# Patient Record
Sex: FEMALE | URBAN - METROPOLITAN AREA
[De-identification: names, ages, dates, MRNs, and addresses within clinical notes are randomized per-mention and may not be internally consistent; named-entity substitution may affect disease eponyms.]

---

## 2018-12-14 ENCOUNTER — RECORDS - HEALTHEAST (OUTPATIENT)
Dept: LAB | Facility: CLINIC | Age: 53
End: 2018-12-14

## 2018-12-14 LAB
ALBUMIN SERPL-MCNC: 4 G/DL (ref 3.5–5)
ALP SERPL-CCNC: 68 U/L (ref 45–120)
ALT SERPL W P-5'-P-CCNC: 15 U/L (ref 0–45)
AST SERPL W P-5'-P-CCNC: 20 U/L (ref 0–40)
BASOPHILS # BLD AUTO: 0 THOU/UL (ref 0–0.2)
BASOPHILS NFR BLD AUTO: 0 % (ref 0–2)
BILIRUB SERPL-MCNC: 0.5 MG/DL (ref 0–1)
BUN SERPL-MCNC: 21 MG/DL (ref 8–22)
C REACTIVE PROTEIN LHE: <0.1 MG/DL (ref 0–0.8)
CALCIUM SERPL-MCNC: 9.7 MG/DL (ref 8.5–10.5)
CHLORIDE BLD-SCNC: 104 MMOL/L (ref 98–107)
CHOLEST SERPL-MCNC: 215 MG/DL
CHOLEST SERPL-MCNC: 215 MG/DL
CK SERPL-CCNC: 44 U/L (ref 30–190)
CO2 SERPL-SCNC: 26 MMOL/L (ref 22–31)
CREAT SERPL-MCNC: 0.73 MG/DL (ref 0.6–1.1)
EOSINOPHIL # BLD AUTO: 0.1 THOU/UL (ref 0–0.4)
EOSINOPHIL NFR BLD AUTO: 2 % (ref 0–6)
ERYTHROCYTE [DISTWIDTH] IN BLOOD BY AUTOMATED COUNT: 11.4 % (ref 11–14.5)
ERYTHROCYTE [SEDIMENTATION RATE] IN BLOOD BY WESTERGREN METHOD: 12 MM/HR (ref 0–20)
FASTING STATUS PATIENT QL REPORTED: ABNORMAL
GFR SERPL CREATININE-BSD FRML MDRD: >60 ML/MIN/1.73M2
GGT SERPL-CCNC: 13 U/L (ref 0–50)
GLUCOSE BLD-MCNC: 84 MG/DL (ref 70–125)
HCT VFR BLD AUTO: 43.3 % (ref 35–47)
HDLC SERPL-MCNC: 68 MG/DL
HGB BLD-MCNC: 14.9 G/DL (ref 12–16)
IRON SERPL-MCNC: 93 UG/DL (ref 42–175)
LDH SERPL L TO P-CCNC: 149 U/L (ref 125–220)
LDLC SERPL CALC-MCNC: 138 MG/DL
LYMPHOCYTES # BLD AUTO: 2.4 THOU/UL (ref 0.8–4.4)
LYMPHOCYTES NFR BLD AUTO: 43 % (ref 20–40)
MAGNESIUM SERPL-MCNC: 2.3 MG/DL (ref 1.8–2.6)
MCH RBC QN AUTO: 31.2 PG (ref 27–34)
MCHC RBC AUTO-ENTMCNC: 34.4 G/DL (ref 32–36)
MCV RBC AUTO: 91 FL (ref 80–100)
MONOCYTES # BLD AUTO: 0.5 THOU/UL (ref 0–0.9)
MONOCYTES NFR BLD AUTO: 9 % (ref 2–10)
NEUTROPHILS # BLD AUTO: 2.6 THOU/UL (ref 2–7.7)
NEUTROPHILS NFR BLD AUTO: 46 % (ref 50–70)
PHOSPHATE SERPL-MCNC: 4 MG/DL (ref 2.5–4.5)
PLATELET # BLD AUTO: 284 THOU/UL (ref 140–440)
PMV BLD AUTO: 9 FL (ref 8.5–12.5)
POTASSIUM BLD-SCNC: 4.1 MMOL/L (ref 3.5–5)
PROT SERPL-MCNC: 6.7 G/DL (ref 6–8)
RBC # BLD AUTO: 4.78 MILL/UL (ref 3.8–5.4)
SODIUM SERPL-SCNC: 139 MMOL/L (ref 136–145)
TRIGL SERPL-MCNC: 46 MG/DL
TRIGL SERPL-MCNC: 46 MG/DL
TSH SERPL DL<=0.005 MIU/L-ACNC: 1.48 UIU/ML (ref 0.3–5)
URATE SERPL-MCNC: 5.2 MG/DL (ref 2–7.5)
WBC: 5.6 THOU/UL (ref 4–11)

## 2019-01-23 ENCOUNTER — RECORDS - HEALTHEAST (OUTPATIENT)
Dept: ADMINISTRATIVE | Facility: OTHER | Age: 54
End: 2019-01-23

## 2019-02-19 ENCOUNTER — COMMUNICATION - HEALTHEAST (OUTPATIENT)
Dept: TELEHEALTH | Facility: CLINIC | Age: 54
End: 2019-02-19

## 2019-02-19 ENCOUNTER — HOSPITAL ENCOUNTER (OUTPATIENT)
Dept: MRI IMAGING | Facility: CLINIC | Age: 54
Discharge: HOME | End: 2019-02-19
Attending: PSYCHIATRY & NEUROLOGY

## 2019-02-19 DIAGNOSIS — R93.89 ABNORMAL MRI: ICD-10-CM

## 2019-02-19 DIAGNOSIS — R53.1 RIGHT SIDED WEAKNESS: ICD-10-CM

## 2019-03-08 ENCOUNTER — RECORDS - HEALTHEAST (OUTPATIENT)
Dept: RADIOLOGY | Facility: CLINIC | Age: 54
End: 2019-03-08

## 2019-05-06 ENCOUNTER — RECORDS - HEALTHEAST (OUTPATIENT)
Dept: LAB | Facility: CLINIC | Age: 54
End: 2019-05-06

## 2019-05-06 LAB — FERRITIN SERPL-MCNC: 210 NG/ML (ref 10–130)

## 2019-08-27 ENCOUNTER — RECORDS - HEALTHEAST (OUTPATIENT)
Dept: ADMINISTRATIVE | Facility: OTHER | Age: 54
End: 2019-08-27

## 2020-06-05 ENCOUNTER — RECORDS - HEALTHEAST (OUTPATIENT)
Dept: LAB | Facility: CLINIC | Age: 55
End: 2020-06-05

## 2020-06-05 LAB
ALBUMIN SERPL-MCNC: 3.7 G/DL (ref 3.5–5)
ALP SERPL-CCNC: 75 U/L (ref 45–120)
ALT SERPL W P-5'-P-CCNC: 18 U/L (ref 0–45)
AST SERPL W P-5'-P-CCNC: 25 U/L (ref 0–40)
BILIRUB SERPL-MCNC: 0.3 MG/DL (ref 0–1)
BUN SERPL-MCNC: 22 MG/DL (ref 8–22)
C REACTIVE PROTEIN LHE: 0.1 MG/DL (ref 0–0.8)
CALCIUM SERPL-MCNC: 9.3 MG/DL (ref 8.5–10.5)
CHLORIDE BLD-SCNC: 106 MMOL/L (ref 98–107)
CHOLEST SERPL-MCNC: 203 MG/DL
CK SERPL-CCNC: 51 U/L (ref 30–190)
CO2 SERPL-SCNC: 25 MMOL/L (ref 22–31)
CREAT SERPL-MCNC: 0.7 MG/DL (ref 0.6–1.1)
ERYTHROCYTE [SEDIMENTATION RATE] IN BLOOD BY WESTERGREN METHOD: 13 MM/HR (ref 0–20)
FERRITIN SERPL-MCNC: 229 NG/ML (ref 10–130)
GFR SERPL CREATININE-BSD FRML MDRD: >60 ML/MIN/1.73M2
GGT SERPL-CCNC: 12 U/L (ref 0–50)
GLUCOSE BLD-MCNC: 93 MG/DL (ref 70–125)
IRON SERPL-MCNC: 68 UG/DL (ref 42–175)
LDH SERPL L TO P-CCNC: 144 U/L (ref 125–220)
MAGNESIUM SERPL-MCNC: 2 MG/DL (ref 1.8–2.6)
PHOSPHATE SERPL-MCNC: 3.9 MG/DL (ref 2.5–4.5)
POTASSIUM BLD-SCNC: 3.6 MMOL/L (ref 3.5–5)
PROT SERPL-MCNC: 6.5 G/DL (ref 6–8)
SODIUM SERPL-SCNC: 140 MMOL/L (ref 136–145)
TRIGL SERPL-MCNC: 38 MG/DL
TSH SERPL DL<=0.005 MIU/L-ACNC: 1.41 UIU/ML (ref 0.3–5)
URATE SERPL-MCNC: 3.5 MG/DL (ref 2–7.5)

## 2020-06-06 LAB — THYROGLOB AB SERPL-ACNC: <0.9 IU/ML (ref 0–4)

## 2020-06-09 LAB
ANA SER QL: 0.5 U
THYROID PEROXIDASE ANTIBODIES - HISTORICAL: <3 IU/ML (ref 0–5.6)

## 2021-06-01 ENCOUNTER — RECORDS - HEALTHEAST (OUTPATIENT)
Dept: ADMINISTRATIVE | Facility: CLINIC | Age: 56
End: 2021-06-01

## 2021-06-02 ENCOUNTER — RECORDS - HEALTHEAST (OUTPATIENT)
Dept: ADMINISTRATIVE | Facility: CLINIC | Age: 56
End: 2021-06-02

## 2021-06-27 ENCOUNTER — HEALTH MAINTENANCE LETTER (OUTPATIENT)
Age: 56
End: 2021-06-27

## 2021-10-17 ENCOUNTER — HEALTH MAINTENANCE LETTER (OUTPATIENT)
Age: 56
End: 2021-10-17

## 2022-04-04 ENCOUNTER — TELEPHONE (OUTPATIENT)
Dept: NEUROLOGY | Facility: CLINIC | Age: 57
End: 2022-04-04
Payer: MEDICARE

## 2022-04-04 NOTE — TELEPHONE ENCOUNTER
Health Call Center    Phone Message    May a detailed message be left on voicemail: yes     Reason for Call: Other: pt requesting MRI prior to appt with Dr. Moore on 9/8/22. Please call back when orders are available.    Action Taken: Message routed to:  [facility] ([facility]): neurology    Travel Screening: Not Applicable

## 2022-04-04 NOTE — TELEPHONE ENCOUNTER
Spoke with pt and let her know that we will be unable to order an MRI for her at this time because she has never been seen by Dr. Moore before. Pt verbalized understanding.     Magalis Oconnell RN on 4/4/2022 at 1:35 PM

## 2022-06-24 ENCOUNTER — LAB REQUISITION (OUTPATIENT)
Dept: LAB | Facility: CLINIC | Age: 57
End: 2022-06-24

## 2022-06-24 LAB
ESTRADIOL SERPL-MCNC: 28 PG/ML
FERRITIN SERPL-MCNC: 167 NG/ML (ref 11–328)
PROGEST SERPL-MCNC: 0.2 NG/ML
T3FREE SERPL-MCNC: 2.6 PG/ML (ref 2–4.4)
T4 FREE SERPL-MCNC: 1.29 NG/DL (ref 0.9–1.7)
TSH SERPL DL<=0.005 MIU/L-ACNC: 1.37 UIU/ML (ref 0.3–4.2)
VIT B12 SERPL-MCNC: 547 PG/ML (ref 232–1245)

## 2022-06-24 PROCEDURE — 84481 FREE ASSAY (FT-3): CPT | Performed by: FAMILY MEDICINE

## 2022-06-24 PROCEDURE — 84443 ASSAY THYROID STIM HORMONE: CPT | Performed by: FAMILY MEDICINE

## 2022-06-24 PROCEDURE — 83550 IRON BINDING TEST: CPT | Performed by: FAMILY MEDICINE

## 2022-06-24 PROCEDURE — 84270 ASSAY OF SEX HORMONE GLOBUL: CPT | Performed by: FAMILY MEDICINE

## 2022-06-24 PROCEDURE — 82607 VITAMIN B-12: CPT | Performed by: FAMILY MEDICINE

## 2022-06-24 PROCEDURE — 84144 ASSAY OF PROGESTERONE: CPT | Performed by: FAMILY MEDICINE

## 2022-06-24 PROCEDURE — 82728 ASSAY OF FERRITIN: CPT | Performed by: FAMILY MEDICINE

## 2022-06-24 PROCEDURE — 84403 ASSAY OF TOTAL TESTOSTERONE: CPT | Performed by: FAMILY MEDICINE

## 2022-06-24 PROCEDURE — 82670 ASSAY OF TOTAL ESTRADIOL: CPT | Performed by: FAMILY MEDICINE

## 2022-06-24 PROCEDURE — 82627 DEHYDROEPIANDROSTERONE: CPT | Performed by: FAMILY MEDICINE

## 2022-06-24 PROCEDURE — 84439 ASSAY OF FREE THYROXINE: CPT | Performed by: FAMILY MEDICINE

## 2022-06-25 LAB
IRON SATN MFR SERPL: 24 % (ref 15–46)
IRON SERPL-MCNC: 82 UG/DL (ref 35–180)
SHBG SERPL-SCNC: 46 NMOL/L (ref 30–135)
TIBC SERPL-MCNC: 339 UG/DL (ref 240–430)

## 2022-06-27 LAB — DHEA-S SERPL-MCNC: 293 UG/DL (ref 35–430)

## 2022-06-28 LAB
TESTOST FREE SERPL-MCNC: 0.36 NG/DL
TESTOST SERPL-MCNC: 25 NG/DL (ref 8–60)

## 2022-09-08 ENCOUNTER — OFFICE VISIT (OUTPATIENT)
Dept: NEUROLOGY | Facility: CLINIC | Age: 57
End: 2022-09-08
Payer: MEDICARE

## 2022-09-08 VITALS
DIASTOLIC BLOOD PRESSURE: 66 MMHG | WEIGHT: 124.8 LBS | SYSTOLIC BLOOD PRESSURE: 103 MMHG | HEIGHT: 64 IN | HEART RATE: 72 BPM | BODY MASS INDEX: 21.31 KG/M2

## 2022-09-08 DIAGNOSIS — R93.89 ABNORMAL MRI: Primary | ICD-10-CM

## 2022-09-08 DIAGNOSIS — R20.2 PARESTHESIAS: ICD-10-CM

## 2022-09-08 PROCEDURE — 99205 OFFICE O/P NEW HI 60 MIN: CPT | Performed by: PSYCHIATRY & NEUROLOGY

## 2022-09-08 RX ORDER — PROGESTERONE 100 MG/1
100 CAPSULE ORAL DAILY
COMMUNITY

## 2022-09-08 RX ORDER — ESTRADIOL 0.5 MG/1
0.5 TABLET ORAL DAILY
COMMUNITY

## 2022-09-08 ASSESSMENT — MONTREAL COGNITIVE ASSESSMENT (MOCA)
13. ORIENTATION SUBSCORE: 6
11. FOR EACH PAIR OF WORDS, WHAT CATEGORY DO THEY BELONG TO (OUT OF 2): 2
8. SERIAL SUBTRACTION OF 7S: 0
12. MEMORY INDEX SCORE: 4
WHAT LEVEL OF EDUCATION WAS ATTAINED: 0
VISUOSPATIAL/EXECUTIVE SUBSCORE: 4
7. [VIGILENCE] TAP WHEN HEARING DESIGNATED LETTER: 1
6. READ LIST OF DIGITS [FORWARD/BACKWARD]: 2
WHAT IS THE TOTAL SCORE (OUT OF 30): 25
4. NAME EACH OF THE THREE ANIMALS SHOWN: 3
9. REPEAT EACH SENTENCE: 2
10. [FLUENCY] NAME WORDS STARTING WITH DESIGNATED LETTER: 1

## 2022-09-08 NOTE — PROGRESS NOTES
"INITIAL NEUROLOGY CONSULTATION    DATE OF VISIT: 9/8/2022  MRN: [id number]  PATIENT NAME: Gabi Carrillo  YOB: 1965    REFERRING PROVIDER: No ref. provider found    Chief Complaint   Patient presents with     Rule out MS      New patient      SUBJECTIVE:                                                      HPI:   Gabi Carrillo is a 57 year old female self-referred for MS rule-out.  Per chart review, there is a brain MRI from 2019 when the patient was at [facility].  This showed some nonspecific white matter changes.  Recommendation was for re-imaging in 3 months but I do not see that this occurred.  The study in 2019 was done for \"weakness.\"    She says that she initially presented to Neurology due to some cognitive issues and stammering after mitral valve surgery in 2009. These symptoms have not worsened. There was mention of \"possible MS.\" Looking into the [facility] portal, I found mild degenerative changes on cervical spine imaging from 4.2014, unremarkable thoracic imaging same time.  11.2019 thoracic imaging also unremarkable.  There is a brain MRI from January 2015 that showed blood deposits versus multiple Cavernous malformations and some T2 hyperintensities.  The most recent brain imaging is in our system from 2019, the MRI as mentioned above.    She reports a back injury recently and she is going through menopause.     She gets an odd sensation in her head and her Right leg will feel heavy,this can last up to an hour. She has dizzy spells, dramamine helps. Mother has vertigo, lupus hypothyroidism.  She has occasional tremor and odd sensations in her feet. She says that she follows with Dr. Richard at [facility].   Gabi says that she is very stressed and feels like this affects her memory.     She has crawling sensation and itching sensations in the Left shoulder/back.  She follows with radiology for trigger point injections of the lumbar spine.      No episodes of unilateral " "symptoms such as weakness or sensory change.  No episodes of monocular vision loss.  She sees a chiropractor.  She says the \"damanos\" for urinary urgency and finds this quite helpful.    Gabi lives in [place] but receives her medical care here in [place] because she does not trust the doctors there.  She says she also does not trust the results of the brain MRI that was done at [facility] in 2019.  Previous MMSE scores from 2014 and 2015: 22/30 and 24/30.    No past medical history on file.  No past surgical history on file.    albuterol (PROVENTIL HFA) 90 mcg/actuation inhaler, [ALBUTEROL (PROVENTIL HFA) 90 MCG/ACTUATION INHALER] Inhale 1 puff every 4 (four) hours as needed for wheezing.  amoxicillin (AMOXIL) 500 MG capsule, [AMOXICILLIN (AMOXIL) 500 MG CAPSULE] Take 500 mg by mouth. Take 4 capsules prior to dental appointment  budesonide-formoterol (SYMBICORT) 80-4.5 mcg/actuation inhaler, [BUDESONIDE-FORMOTEROL (SYMBICORT) 80-4.5 MCG/ACTUATION INHALER] Inhale 2 puffs daily.  estradiol (ESTRACE) 0.5 MG tablet, Take 0.5 mg by mouth daily 1 capsule daily  fluconazole (DIFLUCAN) 150 MG tablet, [FLUCONAZOLE (DIFLUCAN) 150 MG TABLET] Take 150 mg by mouth. TAKE 1 TABLET NOW AND AGAIN upon completion of antibiotic.  flunisolide (NASALIDE) 25 mcg (0.025 %) [place], [FLUNISOLIDE (NASALIDE) 25 MCG (0.025 %) SPRY] 2 sprays into each nostril 2 (two) times a day.  fluticasone (FLONASE) 50 mcg/actuation nasal spray, [FLUTICASONE (FLONASE) 50 MCG/ACTUATION NASAL SPRAY] 1 spray into each nostril daily.  ibuprofen (ADVIL,MOTRIN) 200 MG tablet, [IBUPROFEN (ADVIL,MOTRIN) 200 MG TABLET] Take 200 mg by mouth every 6 (six) hours as needed for pain. Take with food  montelukast (SINGULAIR) 10 mg tablet, [MONTELUKAST (SINGULAIR) 10 MG TABLET] Take 10 mg by mouth bedtime.  predniSONE (DELTASONE) 10 MG tablet, [PREDNISONE (DELTASONE) 10 MG TABLET] Take 20 mg by mouth daily. Take with food as needed  progesterone (PROMETRIUM) 100 MG " "capsule, Take 100 mg by mouth daily 300 mg  buPROPion (WELLBUTRIN) 75 MG tablet, [BUPROPION (WELLBUTRIN) 75 MG TABLET] Take 75 mg by mouth. take one tablet by mouth daily for two weeks, then take 2 tablets daily. (Patient not taking: Reported on 9/8/2022)  omeprazole (PRILOSEC) 20 MG capsule, [OMEPRAZOLE (PRILOSEC) 20 MG CAPSULE] Take 20 mg by mouth daily. As needed (Patient not taking: Reported on 9/8/2022)  triamcinolone (KENALOG) 0.1 % cream, [TRIAMCINOLONE (KENALOG) 0.1 % CREAM] Apply topically 2 (two) times a day. Apply to affected areas 2 times a day (Patient not taking: Reported on 9/8/2022)    No current facility-administered medications on file prior to visit.    Allergies   Allergen Reactions     Eggs Or Egg-Derived Products [Chicken-Derived Products (Egg)] Unknown     Erythromycin Base [Erythromycin] Unknown     Grass Other (See Comments)     asthma     Other Environmental Allergy Other (See Comments)     Cleaning chemicals and perfumes     Pollen Extract Other (See Comments)     Sneezing and asthma          Social History     Tobacco Use     Smoking status: Never Smoker     Smokeless tobacco: Never Used   Substance Use Topics     Alcohol use: Yes     Comment: 3-7 glasses of wine a week       REVIEW OF SYSTEMS:                                                      10-point review of systems is negative except as mentioned above in HPI.     EXAM:                                                      Physical Exam:   Vitals: /66 (BP Location: Right arm, Patient Position: Sitting)   Pulse 72   Ht 1.626 m (5' 4\")   Wt 56.6 kg (124 lb 12.8 oz)   BMI 21.42 kg/m    BMI= Body mass index is 21.42 kg/m .  GENERAL: NAD.  HEENT: NC/AT.   CV: RRR. S1, S2.   NECK: No bruits.  PULM: Non-labored breathing.   Neurologic:  MENTAL STATUS: Alert, attentive. Speech is fluent. Normal comprehension. MoCA: 25/30 (normal is 26 and above). Fair concentration, tangential at times. Adequate fund of knowledge.   CRANIAL " NERVES: Discs flat. Visual fields intact to confrontation. Pupils equally, round and reactive to light. Facial sensation and movement normal. EOM full. Hearing intact to conversation.  Trapezius strength intact. Palate moves symmetrically. Tongue midline.  MOTOR: 5/5 in proximal and distal muscle groups of upper and lower extremities. Tone and bulk normal.   DTRs: Intact and symmetric in biceps, BR, patellae.  Babinski down-going bilaterally.   SENSATION: Normal light touch and pinprick. Intact proprioception at great toes. Vibration: Normal at both ankles.   COORDINATION: Normal finger nose finger. Finger tapping normal. Knee heel shin normal.  STATION AND GAIT: Romberg negative. Good postural reflexes. Casual gait and tandem normal.    Relevant Data:  Brain MRI (2.2019):  IMPRESSION:  CONCLUSION:  1.  No mass, stroke or acute hemorrhage.  2.  Scattered nonspecific foci of subcortical signal loss noted on gradient echo images. They are of equivocal clinical significance.  3.  Scattered nonspecific foci of T2 prolongation in the supratentorial white matter.  4.  If the patient's symptoms persist or progress, consider follow-up head MRI in 3-6 months.    Available imaging reviewed independently by me.  Agree with radiology read.    ASSESSMENT and PLAN:                                                      Assessment:     ICD-10-CM    1. Abnormal MRI  R93.89 MR Brain w/o & w Contrast     MR Cervical Spine w/o & w Contrast     CANCELED: MR Brain w/o & w Contrast     CANCELED: MR Cervical Spine w/o & w Contrast   2. Paresthesias  R20.2 MR Brain w/o & w Contrast     MR Cervical Spine w/o & w Contrast     CANCELED: MR Brain w/o & w Contrast     CANCELED: MR Cervical Spine w/o & w Contrast        Ms. Carrillo is a pleasant 57-year-old woman with somewhat vague history but multiple potentially neurologic concerns.  She has migrating paresthesias.  She has episodes of leg weakness, in the setting of chronic back pain.  Main  concern is to rule out MS.  History of abnormal brain MRI in the past of unclear significance.  We will do updated brain and cervical spine imaging in the near future and then follow-up thereafter.  Based on what I am seeing, I do not think that the patient's imaging is consistent with demyelination but the updated test will help delineate things further.  Wilma understands and agrees with the plan.    Plan:  -- MRI brain and cervical spine.  These are the parts of the central nervous system that can be affected by MS so I would like to get an updated picture.  -- We will decide about any additional evaluations/treatments based on findings on MRI.    Total Time: 60 minutes were spent with the patient and in chart review/documentation (as described above in Assessment and Plan) /coordinating the care on date of service.    Betty Moore MD  Neurology    Dragon software used in the dictation of this note.

## 2022-09-08 NOTE — NURSING NOTE
Chief Complaint   Patient presents with     Rule out MS      New patient      Adonis Kaity CMA@ on 9/8/2022 at 1:08 PM

## 2022-09-08 NOTE — PATIENT INSTRUCTIONS
Plan:  -- MRI brain and cervical spine.  These are the parts of the central nervous system that can be affected by MS so I would like to get an updated picture.  -- We will decide about any additional evaluations/treatments based on findings on MRI.

## 2022-09-26 ENCOUNTER — LAB REQUISITION (OUTPATIENT)
Dept: LAB | Facility: CLINIC | Age: 57
End: 2022-09-26

## 2022-09-26 LAB
ALBUMIN SERPL BCG-MCNC: 4.6 G/DL (ref 3.5–5.2)
ALP SERPL-CCNC: 62 U/L (ref 35–104)
ALT SERPL W P-5'-P-CCNC: 14 U/L (ref 10–35)
ANION GAP SERPL CALCULATED.3IONS-SCNC: 8 MMOL/L (ref 7–15)
AST SERPL W P-5'-P-CCNC: 21 U/L (ref 10–35)
BASOPHILS # BLD AUTO: 0 10E3/UL (ref 0–0.2)
BASOPHILS NFR BLD AUTO: 0 %
BILIRUB SERPL-MCNC: 0.3 MG/DL
BUN SERPL-MCNC: 23.6 MG/DL (ref 6–20)
CALCIUM SERPL-MCNC: 8.9 MG/DL (ref 8.6–10)
CHLORIDE SERPL-SCNC: 100 MMOL/L (ref 98–107)
CHOLEST SERPL-MCNC: 234 MG/DL
CK SERPL-CCNC: 50 U/L (ref 26–192)
CREAT SERPL-MCNC: 0.63 MG/DL (ref 0.51–0.95)
DEPRECATED HCO3 PLAS-SCNC: 26 MMOL/L (ref 22–29)
EOSINOPHIL # BLD AUTO: 0.1 10E3/UL (ref 0–0.7)
EOSINOPHIL NFR BLD AUTO: 1 %
ERYTHROCYTE [DISTWIDTH] IN BLOOD BY AUTOMATED COUNT: 11.9 % (ref 10–15)
FERRITIN SERPL-MCNC: 151 NG/ML (ref 11–328)
GFR SERPL CREATININE-BSD FRML MDRD: >90 ML/MIN/1.73M2
GGT SERPL-CCNC: 13 U/L (ref 5–36)
GLUCOSE SERPL-MCNC: 94 MG/DL (ref 70–99)
HCT VFR BLD AUTO: 41 % (ref 35–47)
HDLC SERPL-MCNC: 78 MG/DL
HGB BLD-MCNC: 13.8 G/DL (ref 11.7–15.7)
IMM GRANULOCYTES # BLD: 0 10E3/UL
IMM GRANULOCYTES NFR BLD: 0 %
LDH SERPL L TO P-CCNC: 155 U/L (ref 0–250)
LDLC SERPL CALC-MCNC: 145 MG/DL
LYMPHOCYTES # BLD AUTO: 1.9 10E3/UL (ref 0.8–5.3)
LYMPHOCYTES NFR BLD AUTO: 26 %
MAGNESIUM SERPL-MCNC: 2 MG/DL (ref 1.7–2.3)
MCH RBC QN AUTO: 30.6 PG (ref 26.5–33)
MCHC RBC AUTO-ENTMCNC: 33.7 G/DL (ref 31.5–36.5)
MCV RBC AUTO: 91 FL (ref 78–100)
MONOCYTES # BLD AUTO: 0.5 10E3/UL (ref 0–1.3)
MONOCYTES NFR BLD AUTO: 6 %
NEUTROPHILS # BLD AUTO: 4.9 10E3/UL (ref 1.6–8.3)
NEUTROPHILS NFR BLD AUTO: 67 %
NONHDLC SERPL-MCNC: 156 MG/DL
NRBC # BLD AUTO: 0 10E3/UL
NRBC BLD AUTO-RTO: 0 /100
PHOSPHATE SERPL-MCNC: 3.4 MG/DL (ref 2.5–4.5)
PLATELET # BLD AUTO: 307 10E3/UL (ref 150–450)
POTASSIUM SERPL-SCNC: 3.9 MMOL/L (ref 3.4–5.3)
PROT SERPL-MCNC: 6.6 G/DL (ref 6.4–8.3)
RBC # BLD AUTO: 4.51 10E6/UL (ref 3.8–5.2)
SODIUM SERPL-SCNC: 134 MMOL/L (ref 136–145)
TRIGL SERPL-MCNC: 55 MG/DL
URATE SERPL-MCNC: 3.3 MG/DL (ref 2.4–5.7)
WBC # BLD AUTO: 7.5 10E3/UL (ref 4–11)

## 2022-09-26 PROCEDURE — 80053 COMPREHEN METABOLIC PANEL: CPT | Performed by: CHIROPRACTOR

## 2022-09-26 PROCEDURE — 82550 ASSAY OF CK (CPK): CPT | Performed by: CHIROPRACTOR

## 2022-09-26 PROCEDURE — 83615 LACTATE (LD) (LDH) ENZYME: CPT | Performed by: CHIROPRACTOR

## 2022-09-26 PROCEDURE — 83735 ASSAY OF MAGNESIUM: CPT | Performed by: CHIROPRACTOR

## 2022-09-26 PROCEDURE — 82977 ASSAY OF GGT: CPT | Performed by: CHIROPRACTOR

## 2022-09-26 PROCEDURE — 82728 ASSAY OF FERRITIN: CPT | Performed by: CHIROPRACTOR

## 2022-09-26 PROCEDURE — 84100 ASSAY OF PHOSPHORUS: CPT | Performed by: CHIROPRACTOR

## 2022-09-26 PROCEDURE — 80061 LIPID PANEL: CPT | Performed by: CHIROPRACTOR

## 2022-09-26 PROCEDURE — 85004 AUTOMATED DIFF WBC COUNT: CPT | Performed by: CHIROPRACTOR

## 2022-09-26 PROCEDURE — 84443 ASSAY THYROID STIM HORMONE: CPT | Performed by: CHIROPRACTOR

## 2022-09-26 PROCEDURE — 84550 ASSAY OF BLOOD/URIC ACID: CPT | Performed by: CHIROPRACTOR

## 2022-09-26 PROCEDURE — 83540 ASSAY OF IRON: CPT | Performed by: CHIROPRACTOR

## 2022-09-26 PROCEDURE — 82306 VITAMIN D 25 HYDROXY: CPT | Performed by: CHIROPRACTOR

## 2022-09-27 LAB
DEPRECATED CALCIDIOL+CALCIFEROL SERPL-MC: 113 UG/L (ref 20–75)
IRON SERPL-MCNC: 103 UG/DL (ref 37–145)
TSH SERPL DL<=0.005 MIU/L-ACNC: 1.86 UIU/ML (ref 0.3–4.2)

## 2022-09-30 NOTE — RESULT ENCOUNTER NOTE
Please let Wilma know that her updated imaging does show some increased degenerative change in her cervical spine and some degenerative changes of her mandibular joint (part of her jaw).  No definitive evidence of MS on her imaging.    Thank you,  Dr. Moore

## 2022-10-03 ENCOUNTER — TELEPHONE (OUTPATIENT)
Dept: NEUROLOGY | Facility: CLINIC | Age: 57
End: 2022-10-03

## 2022-10-03 NOTE — TELEPHONE ENCOUNTER
[facility] Call Center    Phone Message    May a detailed message be left on voicemail: yes     Reason for Call: Other: Patient is returning a call from Norm Riggins RN. Please call patient back at # [phone]     Action Taken: Message routed to:  Other: [facility]     Travel Screening: Not Applicable

## 2022-10-03 NOTE — TELEPHONE ENCOUNTER
----- Message from Adonis Don V sent at 9/30/2022  2:18 PM CDT -----  Sorry, I would call her but just in case she has other medical questions that i'm unable to answer.  ----- Message -----  From: Betty Leong MD  Sent: 9/30/2022  11:45 AM CDT  To: [facility] Care Team Pool    Please let Wilma know that her updated imaging does show some increased degenerative change in her cervical spine and some degenerative changes of her mandibular joint (part of her jaw).  No definitive evidence of MS on her imaging.    Thank you,  Dr. Moore

## 2022-10-03 NOTE — TELEPHONE ENCOUNTER
TRC. Will discuss imaging results below. Separate encounter created to schedule phone follow up with   Dr. Moore. Will reassure pt that this will be discussed more in depth at follow up.    Norm Riggins RN, BSN  [facility]        Please let Wilma know that her updated imaging does show some increased degenerative change in her cervical spine and some degenerative changes of her mandibular joint (part of her jaw).  No definitive evidence of MS on her imaging.     Thank you,  Dr. Moore

## 2022-10-04 NOTE — TELEPHONE ENCOUNTER
Relayed message below to patient. Pt verbalizes understanding. She is asking if f/u is needed? If not, she would like to cancel May appt as clinic is 10 hour drive from patients home.     MD please advise if pt needs follow up    Norm Riggins RN, BSN  [facility]      Please let Wilma know that her updated imaging does show some increased degenerative change in her cervical spine and some degenerative changes of her mandibular joint (part of her jaw).  No definitive evidence of MS on her imaging.     Thank you,  Dr. Moore

## 2024-06-25 ENCOUNTER — LAB REQUISITION (OUTPATIENT)
Dept: LAB | Facility: CLINIC | Age: 59
End: 2024-06-25

## 2024-06-25 LAB
ALBUMIN SERPL BCG-MCNC: 4.6 G/DL (ref 3.5–5.2)
ALP SERPL-CCNC: 56 U/L (ref 40–150)
ALT SERPL W P-5'-P-CCNC: 17 U/L (ref 0–50)
ANION GAP SERPL CALCULATED.3IONS-SCNC: 10 MMOL/L (ref 7–15)
AST SERPL W P-5'-P-CCNC: 23 U/L (ref 0–45)
BASOPHILS # BLD AUTO: 0 10E3/UL (ref 0–0.2)
BASOPHILS NFR BLD AUTO: 0 %
BILIRUB SERPL-MCNC: 0.3 MG/DL
BUN SERPL-MCNC: 14.9 MG/DL (ref 6–20)
CALCIUM SERPL-MCNC: 9.1 MG/DL (ref 8.6–10)
CHLORIDE SERPL-SCNC: 102 MMOL/L (ref 98–107)
CK SERPL-CCNC: 72 U/L (ref 26–192)
CREAT SERPL-MCNC: 0.66 MG/DL (ref 0.51–0.95)
DEPRECATED HCO3 PLAS-SCNC: 24 MMOL/L (ref 22–29)
EGFRCR SERPLBLD CKD-EPI 2021: >90 ML/MIN/1.73M2
EOSINOPHIL # BLD AUTO: 0.1 10E3/UL (ref 0–0.7)
EOSINOPHIL NFR BLD AUTO: 2 %
ERYTHROCYTE [DISTWIDTH] IN BLOOD BY AUTOMATED COUNT: 11.9 % (ref 10–15)
FERRITIN SERPL-MCNC: 128 NG/ML (ref 11–328)
GGT SERPL-CCNC: 13 U/L (ref 5–36)
GLUCOSE SERPL-MCNC: 94 MG/DL (ref 70–99)
HCT VFR BLD AUTO: 39.3 % (ref 35–47)
HGB BLD-MCNC: 13.5 G/DL (ref 11.7–15.7)
IMM GRANULOCYTES # BLD: 0 10E3/UL
IMM GRANULOCYTES NFR BLD: 0 %
IRON SERPL-MCNC: 103 UG/DL (ref 37–145)
LDH SERPL L TO P-CCNC: 160 U/L (ref 0–250)
LYMPHOCYTES # BLD AUTO: 1.5 10E3/UL (ref 0.8–5.3)
LYMPHOCYTES NFR BLD AUTO: 32 %
MCH RBC QN AUTO: 31.2 PG (ref 26.5–33)
MCHC RBC AUTO-ENTMCNC: 34.4 G/DL (ref 31.5–36.5)
MCV RBC AUTO: 91 FL (ref 78–100)
MONOCYTES # BLD AUTO: 0.4 10E3/UL (ref 0–1.3)
MONOCYTES NFR BLD AUTO: 9 %
NEUTROPHILS # BLD AUTO: 2.6 10E3/UL (ref 1.6–8.3)
NEUTROPHILS NFR BLD AUTO: 57 %
NRBC # BLD AUTO: 0 10E3/UL
NRBC BLD AUTO-RTO: 0 /100
PLATELET # BLD AUTO: 287 10E3/UL (ref 150–450)
POTASSIUM SERPL-SCNC: 3.7 MMOL/L (ref 3.4–5.3)
PROT SERPL-MCNC: 6.8 G/DL (ref 6.4–8.3)
RBC # BLD AUTO: 4.33 10E6/UL (ref 3.8–5.2)
SODIUM SERPL-SCNC: 136 MMOL/L (ref 135–145)
WBC # BLD AUTO: 4.7 10E3/UL (ref 4–11)

## 2024-06-25 PROCEDURE — 82977 ASSAY OF GGT: CPT | Performed by: CHIROPRACTOR

## 2024-06-25 PROCEDURE — 82306 VITAMIN D 25 HYDROXY: CPT | Performed by: CHIROPRACTOR

## 2024-06-25 PROCEDURE — 84550 ASSAY OF BLOOD/URIC ACID: CPT | Performed by: CHIROPRACTOR

## 2024-06-25 PROCEDURE — 84100 ASSAY OF PHOSPHORUS: CPT | Performed by: CHIROPRACTOR

## 2024-06-25 PROCEDURE — 82728 ASSAY OF FERRITIN: CPT | Performed by: CHIROPRACTOR

## 2024-06-25 PROCEDURE — 83540 ASSAY OF IRON: CPT | Performed by: CHIROPRACTOR

## 2024-06-25 PROCEDURE — 80053 COMPREHEN METABOLIC PANEL: CPT | Performed by: CHIROPRACTOR

## 2024-06-25 PROCEDURE — 85025 COMPLETE CBC W/AUTO DIFF WBC: CPT | Performed by: CHIROPRACTOR

## 2024-06-25 PROCEDURE — 83735 ASSAY OF MAGNESIUM: CPT | Performed by: CHIROPRACTOR

## 2024-06-25 PROCEDURE — 83615 LACTATE (LD) (LDH) ENZYME: CPT | Performed by: CHIROPRACTOR

## 2024-06-25 PROCEDURE — 82550 ASSAY OF CK (CPK): CPT | Performed by: CHIROPRACTOR

## 2024-06-26 LAB
MAGNESIUM SERPL-MCNC: 2.1 MG/DL (ref 1.7–2.3)
PHOSPHATE SERPL-MCNC: 2.8 MG/DL (ref 2.5–4.5)
URATE SERPL-MCNC: 4.3 MG/DL (ref 2.4–5.7)
VIT D+METAB SERPL-MCNC: 102 NG/ML (ref 20–50)

## 2025-04-15 ENCOUNTER — LAB REQUISITION (OUTPATIENT)
Dept: LAB | Facility: CLINIC | Age: 60
End: 2025-04-15

## 2025-04-15 LAB
ALBUMIN SERPL BCG-MCNC: 4.4 G/DL (ref 3.5–5.2)
ALP SERPL-CCNC: 60 U/L (ref 40–150)
ALT SERPL W P-5'-P-CCNC: 21 U/L (ref 0–50)
ANION GAP SERPL CALCULATED.3IONS-SCNC: 12 MMOL/L (ref 7–15)
APO A-I SERPL-MCNC: 64 MG/DL
AST SERPL W P-5'-P-CCNC: 27 U/L (ref 0–45)
BASOPHILS # BLD AUTO: 0 10E3/UL (ref 0–0.2)
BASOPHILS NFR BLD AUTO: 1 %
BILIRUB SERPL-MCNC: 0.4 MG/DL
BUN SERPL-MCNC: 12.9 MG/DL (ref 6–20)
CALCIUM SERPL-MCNC: 9.1 MG/DL (ref 8.8–10.4)
CHLORIDE SERPL-SCNC: 103 MMOL/L (ref 98–107)
CHOLEST SERPL-MCNC: 265 MG/DL
CREAT SERPL-MCNC: 0.68 MG/DL (ref 0.51–0.95)
EGFRCR SERPLBLD CKD-EPI 2021: >90 ML/MIN/1.73M2
EOSINOPHIL # BLD AUTO: 0.1 10E3/UL (ref 0–0.7)
EOSINOPHIL NFR BLD AUTO: 3 %
ERYTHROCYTE [DISTWIDTH] IN BLOOD BY AUTOMATED COUNT: 12.2 % (ref 10–15)
FASTING STATUS PATIENT QL REPORTED: ABNORMAL
FASTING STATUS PATIENT QL REPORTED: ABNORMAL
GLUCOSE SERPL-MCNC: 90 MG/DL (ref 70–99)
HCO3 SERPL-SCNC: 22 MMOL/L (ref 22–29)
HCT VFR BLD AUTO: 36.7 % (ref 35–47)
HDLC SERPL-MCNC: 61 MG/DL
HGB BLD-MCNC: 12.3 G/DL (ref 11.7–15.7)
IMM GRANULOCYTES # BLD: 0 10E3/UL
IMM GRANULOCYTES NFR BLD: 0 %
LDLC SERPL CALC-MCNC: 178 MG/DL
LYMPHOCYTES # BLD AUTO: 1.6 10E3/UL (ref 0.8–5.3)
LYMPHOCYTES NFR BLD AUTO: 36 %
MCH RBC QN AUTO: 30.3 PG (ref 26.5–33)
MCHC RBC AUTO-ENTMCNC: 33.5 G/DL (ref 31.5–36.5)
MCV RBC AUTO: 90 FL (ref 78–100)
MONOCYTES # BLD AUTO: 0.4 10E3/UL (ref 0–1.3)
MONOCYTES NFR BLD AUTO: 10 %
NEUTROPHILS # BLD AUTO: 2.3 10E3/UL (ref 1.6–8.3)
NEUTROPHILS NFR BLD AUTO: 51 %
NONHDLC SERPL-MCNC: 204 MG/DL
NRBC # BLD AUTO: 0 10E3/UL
NRBC BLD AUTO-RTO: 0 /100
PLATELET # BLD AUTO: 273 10E3/UL (ref 150–450)
POTASSIUM SERPL-SCNC: 3.7 MMOL/L (ref 3.4–5.3)
PROT SERPL-MCNC: 6.3 G/DL (ref 6.4–8.3)
RBC # BLD AUTO: 4.06 10E6/UL (ref 3.8–5.2)
SODIUM SERPL-SCNC: 137 MMOL/L (ref 135–145)
TRIGL SERPL-MCNC: 130 MG/DL
WBC # BLD AUTO: 4.4 10E3/UL (ref 4–11)

## 2025-04-15 PROCEDURE — 85004 AUTOMATED DIFF WBC COUNT: CPT | Performed by: CHIROPRACTOR

## 2025-04-15 PROCEDURE — 80061 LIPID PANEL: CPT | Performed by: CHIROPRACTOR

## 2025-04-15 PROCEDURE — 84460 ALANINE AMINO (ALT) (SGPT): CPT | Performed by: CHIROPRACTOR

## 2025-04-15 PROCEDURE — 80048 BASIC METABOLIC PNL TOTAL CA: CPT | Performed by: CHIROPRACTOR

## 2025-04-15 PROCEDURE — 82565 ASSAY OF CREATININE: CPT | Performed by: CHIROPRACTOR

## 2025-04-15 PROCEDURE — 85048 AUTOMATED LEUKOCYTE COUNT: CPT | Performed by: CHIROPRACTOR

## 2025-04-15 PROCEDURE — 83695 ASSAY OF LIPOPROTEIN(A): CPT | Performed by: CHIROPRACTOR
